# Patient Record
Sex: FEMALE
[De-identification: names, ages, dates, MRNs, and addresses within clinical notes are randomized per-mention and may not be internally consistent; named-entity substitution may affect disease eponyms.]

---

## 2018-09-23 ENCOUNTER — HOSPITAL ENCOUNTER (EMERGENCY)
Dept: HOSPITAL 97 - ER | Age: 73
LOS: 1 days | Discharge: HOME | End: 2018-09-24
Payer: MEDICARE

## 2018-09-23 DIAGNOSIS — E11.9: ICD-10-CM

## 2018-09-23 DIAGNOSIS — I10: ICD-10-CM

## 2018-09-23 DIAGNOSIS — J01.90: Primary | ICD-10-CM

## 2018-09-23 DIAGNOSIS — J45.909: ICD-10-CM

## 2018-09-23 PROCEDURE — 87081 CULTURE SCREEN ONLY: CPT

## 2018-09-23 PROCEDURE — 99284 EMERGENCY DEPT VISIT MOD MDM: CPT

## 2018-09-23 PROCEDURE — 96360 HYDRATION IV INFUSION INIT: CPT

## 2018-09-23 PROCEDURE — 71046 X-RAY EXAM CHEST 2 VIEWS: CPT

## 2018-09-23 PROCEDURE — 87070 CULTURE OTHR SPECIMN AEROBIC: CPT

## 2018-09-23 PROCEDURE — 87804 INFLUENZA ASSAY W/OPTIC: CPT

## 2018-09-24 VITALS — TEMPERATURE: 99.5 F

## 2018-09-24 VITALS — OXYGEN SATURATION: 96 % | SYSTOLIC BLOOD PRESSURE: 149 MMHG | DIASTOLIC BLOOD PRESSURE: 63 MMHG

## 2018-09-24 NOTE — EDPHYS
Physician Documentation                                                                           

 [facility]                                                                

Name: Ina Beck                                                                                

Age: 73 yrs                                                                                       

Sex: Female                                                                                       

: 1945                                                                                   

MRN: [id number]                                                                                   

Arrival Date: 2018                                                                          

Time: 22:31                                                                                       

Account#: [id number]                                                                            

Bed 19                                                                                            

Private MD:                                                                                       

ED Physician Jon Graham                                                                         

HPI:                                                                                              

                                                                                             

02:00 This 73 yrs old  Female presents to ER via Ambulatory with complaints of Cough, rn  

      Sore Throat.                                                                                

02:00 The patient or guardian reports cough. Onset: The symptoms/episode began/occurred       rn  

      yesterday. Severity of symptoms: At their worst the symptoms were mild, in the              

      emergency department the symptoms are unchanged. It is unknown whether or not the           

      patient has had similar symptoms in the past. Reports cough, sore throat, drainage,         

      feels mild sob, hx of asthma, reports throat feels swollen when lays down, ok if            

      standing or sitting, son with similar symptoms recently. .                                  

                                                                                                  

Historical:                                                                                       

- Allergies:                                                                                      

                                                                                             

22:50 No Known Allergies;                                                                     lp1 

- Home Meds:                                                                                      

22:50 Symbicort inhalation inhalation [Active]; Metoprolol Tartrate Oral [Active]; Metformin  lp1 

      Oral [Active]; Glipizide Oral [Active];                                                     

23:21 amlodipine oral [Active]; Hydralazine Oral [Active];                                    jd3 

- PMHx:                                                                                           

22:50 Hypertension; Diabetes - NIDDM; Asthma;                                                 lp1 

- PSHx:                                                                                           

22:50 Knee surgery; rotator cuff repair; ;                                           lp1 

                                                                                                  

- Immunization history:: Adult Immunizations up to date.                                          

- Social history:: Smoking status: Patient/guardian denies using tobacco.                         

- Ebola Screening: : No symptoms or risks identified at this time.                                

- Family history:: not pertinent.                                                                 

- Hospitalizations: : No recent hospitalization is reported.                                      

                                                                                                  

                                                                                                  

ROS:                                                                                              

                                                                                             

02:00 Constitutional: Negative for fever, chills, and weight loss, Eyes: Negative for injury, rn  

      pain, redness, and discharge, ENT: + sore throat and drainage Neck: Negative for            

      injury, pain, and swelling, Cardiovascular: Negative for chest pain, palpitations, and      

      edema, Respiratory: Negative for wheezing, and pleuritic chest pain, Abdomen/GI:            

      Negative for abdominal pain, nausea, vomiting, diarrhea, and constipation,                  

      MS/Extremity: Negative for injury and deformity, Skin: Negative for injury, rash, and       

      discoloration, Neuro: Negative for headache, weakness, numbness, tingling, and seizure.     

                                                                                                  

Exam:                                                                                             

02:00 Constitutional:  This is a well developed, well nourished patient who is awake, alert,  rn  

      and in no acute distress. Head/Face:  Normocephalic, atraumatic. Eyes:  Pupils equal        

      round and reactive to light, extra-ocular motions intact.  Lids and lashes normal.          

      Conjunctiva and sclera are non-icteric and not injected.  Cornea within normal limits.      

      Periorbital areas with no swelling, redness, or edema. ENT:  mild pharyngeal erythema,      

      no stridor, no exudate Neck:  Trachea midline, no thyromegaly or masses palpated, and       

      no cervical lymphadenopathy.  Supple, full range of motion without nuchal rigidity, or      

      vertebral point tenderness.  No Meningismus. Cardiovascular:  Regular rate and rhythm       

      with a normal S1 and S2.  No gallops, murmurs, or rubs.  Normal PMI, no JVD.  No pulse      

      deficits. Respiratory:  Lungs have equal breath sounds bilaterally, clear to                

      auscultation and percussion.  No rales, rhonchi or wheezes noted.  No increased work of     

      breathing, no retractions or nasal flaring. Abdomen/GI:  Soft, non-tender, with normal      

      bowel sounds.  No distension or tympany.  No guarding or rebound.  No evidence of           

      tenderness throughout. MS/ Extremity:  Pulses equal, no cyanosis.  Neurovascular            

      intact.  Full, normal range of motion.  Equal circumference. Neuro:  Awake and alert,       

      GCS 15, oriented to person, place, time, and situation.  Cranial nerves II-XII grossly      

      intact.  Motor strength 5/5 in all extremities.  Sensory grossly intact.  Cerebellar        

      exam normal.  Normal gait.                                                                  

                                                                                                  

Vital Signs:                                                                                      

                                                                                             

22:48  / 82; Pulse 112; Resp 20; Temp 99.5(O); Pulse Ox 97% on R/A; Weight 92.53 kg;    lp1 

      Height 4 ft. 11 in. (149.86 cm);                                                            

23:34  / 68; Pulse 99; Resp 18 S; Pulse Ox 96% on R/A;                                  jd3 

                                                                                             

00:18  / 58; Pulse 94; Resp 18 S; Pulse Ox 95% on R/A;                                  jd3 

01:03  / 59; Pulse 94; Resp 18 S; Pulse Ox 100% on R/A;                                 j 

02:  / 63; Pulse 92; Resp 18 S; Pulse Ox 96% on R/A;                                  d3 

                                                                                             

22:48 Body Mass Index 41.20 (92.53 kg, 149.86 cm)                                             lp1 

                                                                                                  

MDM:                                                                                              

                                                                                             

22:41 Patient medically screened.                                                             rn  

                                                                                             

02:00 Differential Diagnosis: Bronchitis Influenza Upper Respiratory Infection Pharyngitis    rn  

      Viral Syndrome Pneumonia. Data reviewed: vital signs, nurses notes, lab test result(s),     

      radiologic studies, plain films, and as a result, I will discharge patient. Counseling:     

      I had a detailed discussion with the patient and/or guardian regarding: the historical      

      points, exam findings, and any diagnostic results supporting the discharge/admit            

      diagnosis, lab results, radiology results, the need for outpatient follow up, to return     

      to the emergency department if symptoms worsen or persist or if there are any questions     

      or concerns that arise at home. Response to treatment: the patient's symptoms have          

      mildly improved after treatment, and as a result, I will discharge patient. Special         

      discussion: I discussed with the patient/guardian in detail that at this point there is     

      no indication for admission to the hospital. It is understood, however, that if the         

      symptoms persist or worsen the patient needs to return immediately for re-evaluation.       

      ED course: Pt improved, strep/flu neg, will dc home with z pack and pcp f/u. .              

                                                                                                  

                                                                                             

22:55 Order name: Flu; Complete Time: 01:28                                                   rn  

                                                                                             

22:55 Order name: Strep; Complete Time: 01:                                                 rn  

                                                                                             

22:55 Order name: XRAY Chest Pa And Lat (2 Views)                                             rn  

                                                                                             

23:38 Order name: Throat Culture                                                              [facility]

                                                                                             

22:55 Order name: IV Start; Complete Time: 23:13                                              rn  

                                                                                                  

Administered Medications:                                                                         

                                                                                             

23:12 Drug: Xopenex 1.25 mg Route: Inhalation;                                                [facility] 

                                                                                             

00:29 Follow up: Response: No adverse reaction                                                [facility] 

                                                                                             

23:13 Drug: NS 0.9% 500 ml Route: IV; Rate: bolus; Site: right antecubital;                   j 

                                                                                             

00:29 Follow up: Response: No adverse reaction; IV Status: Completed infusion; IV Intake:     jd3 

      500ml                                                                                       

01:09 Follow up: Response: No adverse reaction; IV Status: Completed infusion; IV Intake:     jd3 

      500ml                                                                                       

02:00 Drug: Zithromax 500 mg Route: PO;                                                       jd3 

02:15 Follow up: Response: No adverse reaction                                                jd3 

02:22 Drug: Tussionex Pennkinetic ER 5 ml Route: PO;                                          jd3 

02:22 Follow up: Response: Medication administered at discharge.                              jd3 

                                                                                                  

                                                                                                  

Disposition:                                                                                      

18 02:04 Discharged to Home. Impression: Cough, Acute sinusitis.                            

- Condition is Stable.                                                                            

- Discharge Instructions: Cough, Adult.                                                           

- Prescriptions for Zithromax Z- Demetrius 250 mg Oral Tablet - take 1 tablet by ORAL route             

  as directed for 5 days Day 1 - take two (2) tablets one time. Day 2, 3, 4 , 5 take              

  one (1) tablet once daily.; 6 tablet.                                                           

- Medication Reconciliation Form, Thank You Letter, Antibiotic Education, Prescription            

  Opioid Use form.                                                                                

- Follow up: Private Physician; When: As needed; Reason: Recheck today's complaints,              

  Re-evaluation by your physician.                                                                

- Problem is new.                                                                                 

- Symptoms have improved.                                                                         

                                                                                                  

                                                                                                  

                                                                                                  

Signatures:                                                                                       

Dispatcher [employer]                           EDMS                                                 

Jon Graham MD MD rn Pena, Laura, RN                         RN   lp1                                                  

Praveen Dickerson RN                    RN   jd3                                                  

                                                                                                  

Corrections: (The following items were deleted from the chart)                                    

02:24 02:04 2018 02:04 Discharged to Home. Impression: Cough; Acute sinusitis.          jd3 

      Condition is Stable. Forms are Medication Reconciliation Form, Thank You Letter,            

      Antibiotic Education, Prescription Opioid Use. Follow up: Private Physician; When: As       

      needed; Reason: Recheck today's complaints, Re-evaluation by your physician. Problem is     

      new. Symptoms have improved. rn                                                             

                                                                                                  

**************************************************************************************************

## 2018-09-24 NOTE — ER
Nurse's Notes                                                                                     

 [facility]                                                                

Name: Ina Beck                                                                                

Age: 73 yrs                                                                                       

Sex: Female                                                                                       

: 1945                                                                                   

MRN: [id number]                                                                                   

Arrival Date: 2018                                                                          

Time: 22:31                                                                                       

Account#: [id number]                                                                            

Bed 19                                                                                            

Private MD:                                                                                       

Diagnosis: Cough;Acute sinusitis                                                                  

                                                                                                  

Presentation:                                                                                     

                                                                                             

22:47 Presenting complaint: Patient states: Cough, congestion, sore throat that began this    lp1 

      morning, feeling worse now; Denies fever but felt chills today; states "when I lay          

      down, I feel like I can't catch my breath. Transition of care: patient was not received     

      from another setting of care. Onset of symptoms was 2018. Risk                

      Assessment: Do you want to hurt yourself or someone else? Patient reports no desire to      

      harm self or others. Initial Sepsis Screen: Does the patient meet any 2 criteria? No.       

      Patient's initial sepsis screen is negative. Does the patient have a suspected source       

      of infection? No. Patient's initial sepsis screen is negative. Care prior to arrival:       

      None.                                                                                       

22:47 Method Of Arrival: Ambulatory                                                           lp1 

22:47 Acuity: PHIL 3                                                                           lp1 

                                                                                                  

Historical:                                                                                       

- Allergies:                                                                                      

22:50 No Known Allergies;                                                                     lp1 

- Home Meds:                                                                                      

22:50 Symbicort inhalation inhalation [Active]; Metoprolol Tartrate Oral [Active]; Metformin  lp1 

      Oral [Active]; Glipizide Oral [Active];                                                     

23:21 amlodipine oral [Active]; Hydralazine Oral [Active];                                    jd3 

- PMHx:                                                                                           

22:50 Hypertension; Diabetes - NIDDM; Asthma;                                                 lp1 

- PSHx:                                                                                           

22:50 Knee surgery; rotator cuff repair; ;                                           lp1 

                                                                                                  

- Immunization history:: Adult Immunizations up to date.                                          

- Social history:: Smoking status: Patient/guardian denies using tobacco.                         

- Ebola Screening: : No symptoms or risks identified at this time.                                

- Family history:: not pertinent.                                                                 

- Hospitalizations: : No recent hospitalization is reported.                                      

                                                                                                  

                                                                                                  

Screenin:49 Abuse screen: Denies threats or abuse. Nutritional screening: No deficits noted.        jd3 

      Tuberculosis screening: No symptoms or risk factors identified. Fall Risk Ambulatory        

      Aid- None/Bed Rest/Nurse Assist (0 pts). Gait- Normal/Bed Rest/Wheelchair (0 pts)           

      Mental Status- Oriented to own ability (0 pts). Total Esposito Fall Scale indicates No         

      Risk (0-24 pts).                                                                            

                                                                                                  

Assessment:                                                                                       

22:47 General: Appears uncomfortable, Behavior is calm, cooperative, appropriate for age.     jd3 

      Pain: Complains of pain in head Quality of pain is described as aching, dull. Neuro:        

      Level of Consciousness is awake, alert, obeys commands, Oriented to person, place,          

      time, situation, Appropriate for age. Cardiovascular: Capillary refill < 3 seconds          

      Patient's skin is warm and dry. Respiratory: Reports cough that is non-productive,          

      persistent Airway is patent Respiratory effort is even, unlabored, Respiratory pattern      

      is regular, symmetrical, Breath sounds are clear bilaterally. GI: No signs and/or           

      symptoms were reported involving the gastrointestinal system. : No signs and/or           

      symptoms were reported regarding the genitourinary system. EENT: Throat is reddened.        

      Derm: Skin is intact, Skin is dry, Skin is normal, Skin temperature is warm.                

      Musculoskeletal: Circulation, motion, and sensation intact. Range of motion: intact in      

      all extremities.                                                                            

23:34 Reassessment: Patient appears in no apparent distress at this time. Patient and/or      jd3 

      family updated on plan of care and expected duration. Pain level reassessed. Patient is     

      alert, oriented x 3, equal unlabored respirations, skin warm/dry/pink.                      

                                                                                             

00:17 Reassessment: Patient appears in no apparent distress at this time. No changes from     jd3 

      previously documented assessment. Patient and/or family updated on plan of care and         

      expected duration. Pain level reassessed. Patient is alert, oriented x 3, equal             

      unlabored respirations, skin warm/dry/pink.                                                 

01:03 Reassessment: Patient appears in no apparent distress at this time. Patient and/or      jd3 

      family updated on plan of care and expected duration. Pain level reassessed. Patient is     

      alert, oriented x 3, equal unlabored respirations, skin warm/dry/pink.                      

02:23 Reassessment: Patient appears in no apparent distress at this time. Patient and/or      jd3 

      family updated on plan of care and expected duration. Pain level reassessed. Patient is     

      alert, oriented x 3, equal unlabored respirations, skin warm/dry/pink.                      

                                                                                                  

Vital Signs:                                                                                      

                                                                                             

22:48  / 82; Pulse 112; Resp 20; Temp 99.5(O); Pulse Ox 97% on R/A; Weight 92.53 kg;    lp1 

      Height 4 ft. 11 in. (149.86 cm);                                                            

23:34  / 68; Pulse 99; Resp 18 S; Pulse Ox 96% on R/A;                                  jd3 

                                                                                             

00:18  / 58; Pulse 94; Resp 18 S; Pulse Ox 95% on R/A;                                  jd3 

01:03  / 59; Pulse 94; Resp 18 S; Pulse Ox 100% on R/A;                                 jd3 

02:23  / 63; Pulse 92; Resp 18 S; Pulse Ox 96% on R/A;                                  jd3 

                                                                                             

22:48 Body Mass Index 41.20 (92.53 kg, 149.86 cm)                                             lp1 

                                                                                                  

ED Course:                                                                                        

                                                                                             

22:31 Patient arrived in ED.                                                                  es  

22:36 Praveen Dickerson, RN is Primary Nurse.                                                  jd3 

22:41 Jon Graham MD is Attending Physician.                                                rn  

22:48 Triage completed.                                                                       lp1 

22:48 Arm band placed on left wrist.                                                          lp1 

22:49 Patient has correct armband on for positive identification. Bed in low position. Call   jd3 

      light in reach. Side rails up X 1. Adult w/ patient.                                        

23:13 Inserted saline lock: 20 gauge in right antecubital area, using aseptic technique.      jd3 

23:25 Patient moved to radiology via wheelchair.                                              tm4 

23:25 X-ray completed. Patient tolerated procedure well.                                      tm4 

23:25 Patient moved back from radiology.                                                      tm4 

23:38 XRAY Chest Pa And Lat (2 Views) In Process Unspecified.                                 EDMS

09                                                                                             

02:22 No provider procedures requiring assistance completed. IV discontinued, intact,         jd3 

      bleeding controlled, No redness/swelling at site. Pressure dressing applied.                

                                                                                                  

Administered Medications:                                                                         

                                                                                             

23:12 Drug: Xopenex 1.25 mg Route: Inhalation;                                                jd3 

                                                                                             

00:29 Follow up: Response: No adverse reaction                                                jd3 

                                                                                             

23:13 Drug: NS 0.9% 500 ml Route: IV; Rate: bolus; Site: right antecubital;                   jd3 

                                                                                             

00:29 Follow up: Response: No adverse reaction; IV Status: Completed infusion; IV Intake:     jd3 

      500ml                                                                                       

01:09 Follow up: Response: No adverse reaction; IV Status: Completed infusion; IV Intake:     jd3 

      500ml                                                                                       

02:00 Drug: Zithromax 500 mg Route: PO;                                                       jd3 

02:15 Follow up: Response: No adverse reaction                                                jd3 

02:22 Drug: Tussionex Pennkinetic ER 5 ml Route: PO;                                          jd3 

02:22 Follow up: Response: Medication administered at discharge.                              jd3 

                                                                                                  

                                                                                                  

Intake:                                                                                           

00:29 IV: 500ml; Total: 500ml.                                                                jd3 

01:09 IV: 500ml; Total: 1000ml.                                                               jd3 

                                                                                                  

Outcome:                                                                                          

02:04 Discharge ordered by MD.                                                                rn  

02:24 Discharged to home ambulatory, with family.                                             jd3 

02:24 Condition: stable                                                                           

02:24 Discharge instructions given to patient, family, Instructed on discharge instructions,      

      follow up and referral plans. medication usage, Demonstrated understanding of               

      instructions, follow-up care, medications, Prescriptions given X 1.                         

02:24 Patient left the ED.                                                                    jd3 

                                                                                                  

Signatures:                                                                                       

Dispatcher [employer]                           Glory Cruz Tracy                             tm4                                                  

Jon Graham MD MD rn Pena, Laura, RN                         RN   lp1                                                  

Praveen Dickerson RN                    RN   jd3                                                  

                                                                                                  

**************************************************************************************************

## 2018-09-24 NOTE — RAD REPORT
EXAM DESCRIPTION:  RAD - Chest Pa And Lat (2 Views) - 9/23/2018 11:38 pm

 

CLINICAL HISTORY:  Cough and congestion

 

COMPARISON:  June 2015

 

TECHNIQUE:  PA and lateral views of the chest were obtained.

 

FINDINGS:  The lungs are clear.  Lung markings are similar to comparison. Heart size is normal and ce
ntral vasculature is within normal limits.  No pleural effusion or pneumothorax seen.  No acute bony 
finding noted.  No aortic abnormality.  No significant interval change.

 

IMPRESSION:  No acute cardiopulmonary process.

## 2021-01-11 NOTE — RAD REPORT
EXAM DESCRIPTION:  RAD - Chest Pa And Lat (2 Views) - 1/11/2021 4:28 pm

 

CLINICAL HISTORY:  preop

Chest pain.

 

COMPARISON:  Chest Pa And Lat (2 Views) dated 9/23/2018; CHEST PA AND LAT 2 VIEW dated 6/12/2015; LIAT
ST PA AND LAT 2 VIEW dated 2/1/2015; CHEST PA AND LAT 2 VIEW dated 5/21/2014

 

FINDINGS:  The lungs are clear. The heart is upper limit normal in size. No displaced fractures.

 

IMPRESSION:  No acute or concerning finding suspected.

## 2021-01-13 NOTE — P.BOP
Preoperative diagnosis: biliary dyskinesia, RUQ abd pain


Postoperative diagnosis: same, cholecystitis


Primary procedure: Laparoscopic cholecystectomy


Estimated blood loss: <10cc


Specimen: gb


Findings: see dictation


Anesthesia: General


Complications: None


Transferred to: Recovery Room


Condition: Good

## 2021-01-13 NOTE — XMS REPORT
Continuity of Care Document

                           Created on:2021



Patient:SAMUEL TELLO

Sex:Female

:1945

External Reference #:[id number]





Demographics







                          Address                   [address] [place] [facility]



                                                    [place] 



                                                    [place] [address]

 

                          Home Phone                [phone]

 

                          Email Address             [email or web address]

 

                          Preferred Language        English

 

                          Marital Status            Unknown

 

                          Mor[employer] Affiliation     Unknown

 

                          Race                      Unknown

 

                          Additional Race(s)        Unavailable



                                                    White

 

                          Ethnic Group              Unknown









Author







                          Organization              [facility]

t

 

                          Address                   [address]



                                                    [place] [address]

 

                          Phone                     [phone]









Support







                Name            Relationship    Address         Phone

 

                ELISHA          Unavailable     [address]       [phone]



                                                [place] [address] 

 

                ELISHA          Unavailable     Unavailable     [phone]









Care Team Providers







                    Name                Role                Phone

 

                    Unavailable         Unavailable         Unavailable









Problems

This patient has no known problems.



Allergies, Adverse Reactions, Alerts

This patient has no known allergies or adverse reactions.



Medications







       Ordered Filled Start  Stop   Current Ordering Indication Dosage Frequency

 Signature

                    Comments            Components          Source



     Medication Medication Date Date Medication? Clinician                (SIG) 

          



     Name Name                                                   

 

     Alendronate Alendronate           Yes  Seferino                not           

 [facility]



     Sodium Sodium                Osorio                defined           [facility]



                                                                 [facility]



                                                                 [facility]



                                                                 [facility]

 

     Losartan Losartan           Yes  [facility]eferino                not            [facility]

t



     Potassium Potassium                Osorio                defined           

[facility]



                                                                 [facility]



                                                                 [facility]



                                                                 [facility]



                                                                 Clinics

 

     GlipiZIDE GlipiZIDE           Yes  Seferino                not            [facility]



                              Osorio                defined           [facility]



                                                                 [facility]



                                                                 [facility]



                                                                 [facility]in[facility]

 

     Atorvastati Atorvastati           Yes  Seferino                not           

 [facility]



     n Calcium n Calcium                Osorio                defined           

[facility]



                                                                 [facility]



                                                                 [facility]



                                                                 [facility]



                                                                 Clinics

 

     Metformin Metformin           Yes  [facility]eferino                not            [facility]



     HCl  HCl                 Osorio                defined           [facility]



                                                                 [facility]



                                                                 [facility]



                                                                 ent



                                                                 Clinics

 

     Metoprolol Metoprolol           Yes  Seferino                not            [facility]

[facility]



     Tartrate Tartrate                Osorio                defined           [facility]

[facility]



                                                                 [facility]



                                                                 [facility]



                                                                 [facility]



                                                                 ent



                                                                 Clinics







Procedures

This patient has no known procedures.



Encounters







        Start   End     Encounter Admission Attending Care    Care    Encounter 

Source



        Date/Time Date/Time Type    Type    Clinicians Facility Department ID   

   

 

        2020-10-06 2020-10-06 Outpatient                 [facility]  [facility]  [id number]

 [facility]



        00:00[facility]00 [facility]00                                                 [facility]



                                                                        [facility]



                                                                        [facility]



                                                                        [facility]



                                                                        Clinics

 

        2019 Outpatient                 [facility] [address]

[id number] [facility]



        08:00:00 08:00:00                         [facility]



                                                [facility]

a



                                                [facility]         [facility]         ent



                                                                        Clinics







Results

This patient has no known results.

## 2021-01-13 NOTE — P.CNS
Date of Consult: 21


Reason for Consult: Medical management


Requesting Physician: Eddie Briscoe


Chief Complaint: Bradycardia


History of Present Illness: 


75-year-old woman with a history of hypertension and diabetes had and elective 

laparoscopic cholecystectomy for biliary dyskinesia.  Patient is reported to 

have developed bradycardia in the immediate postop period.  She is therefore 

being observed overnight with cardiac monitoring.  Hospitalist service is 

consulted to assist with management of medical problems which include 

hypertension and diabetes.  Patient at the moment denies any complain.


Her heart rate has been stable since transfer from the recovery room.





Allergies





No Known Drug Allergies Allergy (Verified 21 14:52)


   Unknown





Home Medications: 








Aspirin [Aspirin EC] 81 mg PO DAILY 08/04/15 


Atorvastatin Calcium 40 mg PO BEDTIME 21 


Carvedilol [Coreg] 0.5 tab PO BID 21 


Glipizide [Glipizide ER] 5 mg PO BID 21 


Losartan Potassium 100 mg PO DAILY 21 


Metformin ER [Glucophage ER] 500 mg PO BID 21 


Spironolactone 25 mg PO DAILY 21 








- Past Medical/Surgical History


Diabetic: Yes


-: Right Cataract


-: Hypertension


-: DM


-: Bilateral knee replacement


-: rt rotator cuff repair  


-: carpal tunnel repair both wrists  


-: c section  


-: heel spurs 20 yrs ago





- Family History


  ** Brother


Medical History: 


Notes: mother was very healthy,  did not know fathers health status-left when 

she was 3 yrs.  brother  at old age over 70-was very healthy





- Social History


Smoking Status: Never smoker


Alcohol use: No


CD- Drugs: No


Caffeine use: Yes


Place of Residence: Home





Review of Systems


10-point ROS is otherwise unremarkable





Physical Examination














Temp Pulse Resp BP Pulse Ox


 


 96.3 F L  85   16   135/58 L   


 


 21 12:10  21 12:10  21 12:10  21 12:30   








General: Alert, In no apparent distress, Oriented x3


HEENT: Atraumatic, Normocephalic, PERRLA, Mucous membr. moist/pink, EOMI, 

Sclerae nonicteric


Neck: Supple, JVD not distended


Respiratory: Clear to auscultation bilaterally, Normal air movement


Cardiovascular: No edema, Regular rate/rhythm, Normal S1 S2, No murmurs


Capillary refill: <2 Seconds


Gastrointestinal: Normal bowel sounds, Soft and benign, Non-distended, No 

tenderness


Musculoskeletal: No swelling, No tenderness


Integumentary: No rashes, No erythema


Neurological: Normal speech, Normal strength at 5/5 x4 extr, Cranial nerves 3-12

intact





- Problems


(1) Bradycardia


Current Visit: Yes   Status: Acute   





(2) Diabetes mellitus


Onset Date: 08/05/15   Current Visit: No   Status: Acute   





(3) High blood pressure


Onset Date: 08/05/15   Current Visit: No   Status: Acute   


Conclusions/Impression: 


Resume losartan.


Hold Coreg due to bradycardia.


Monitor blood pressure.


Mild insulin sliding scale.


Hold metformin and glipizide.


Patient started on clear liquid diet.

## 2021-01-13 NOTE — EKG
Test Date:    2021-01-11               Test Time:    15:23:56

Technician:   LILIANA                                   

                                                     

MEASUREMENT RESULTS:                                       

Intervals:                                           

Rate:         69                                     

PA:           148                                    

QRSD:         90                                     

QT:           390                                    

QTc:          417                                    

Axis:                                                

P:            55                                     

PA:           148                                    

QRS:          74                                     

T:            78                                     

                                                     

INTERPRETIVE STATEMENTS:                                       

                                                     

Sinus rhythm with premature atrial complexes

Otherwise normal ECG

Compared to ECG 06/12/2015 15:26:18

Atrial premature complex(es) now present



Electronically Signed On 01-13-21 06:09:18 CST by Jorge Mcginnis

## 2021-01-13 NOTE — OP
Date of Procedure:  01/13/2021



Surgeon:  Eddie Briscoe MD



Postoperative Diagnoses:  Biliary dyskinesia, right upper quadrant abdominal pain.



Postoperative Diagnoses:  Biliary dyskinesia, right upper quadrant abdominal pain, acute cholecystiti
s.



Procedure:  Laparoscopic cholecystectomy.



Specimen:  Gallbladder.



Anesthesia:  General plus local.



Findings:  Inflammation of the gallbladder plus above.  Also during the procedure as per Anesthesia, 
they believe the patient has a vasovagal with a short episode of hypotension.



Anesthesia:  General plus local.



Indications:  This is a case of a 75-year-old patient, who comes to us with above diagnosis.  Patient
 wants surgery to be done.  She understands the benefits, alternatives, and risks of laparoscopic, po
ssible open cholecystectomy, which include, but not limited to infection, bleeding, damage to adjacen
t structures, anesthesia complication, recurrence, MI, and death.  She also understands this may not 
relieve the symptoms.  She might need more than 1 surgical intervention.  She understands the situati
on of the country with COVID cases, but also she feels that she has been waiting a long enough and sh
e want to improve and get better, continue with her life, and go back to normal duties and have citlaly
r quality of life because this pain holds her back.  __________ surgery.  She has been trying to even
 control her diet, but has not improved yet.



Description Of Procedure:  Patient was brought to the operating room, placed in supine position.  Ane
sthesia was done without complication.  Abdominal area was prepped and draped in a sterile fashion.  
Marcaine 0.5% was injected for local anesthetic followed by sharp incision of the skin in the infraum
bilical region.  Incision was carried down to fascia, which was opened under direct vision.  Peritone
um was encountered, opened under direct vision.  Vicryl #1 placed inside the fascia.  Colby trocar w
as carefully introduced.  No bleeding was obtained.  I placed 3 more trocars 5 mm each one of them in
 the epigastric right upper quadrant area under direct visualization.  When we insufflated the area, 
the Anesthesia felt that the heart rate dropped and they asked me to relive the abdomen and it improv
ed immediately.  They believed vasovagal, everything else looked intact.  The gave me the green light
 to continue and the procedure was done.  I placed a grasper in the fundus of the gallbladder and ano
ther grasper in the infundibulum retracting the gallbladder in the inferolateral fashion exposing the
 triangle of Calot, obtaining critical view of safety.  Cystic duct and cystic artery were clearly is
olated freed circumferentially and a connection between those and the gallbladder was clearly identif
ied.  I proceeded to ligate those by using at least 3 clips proximal, 1 clip distal, and ligation in 
the middle.  Same was done with the cystic artery.  A small branch of the cystic artery was also liga
paulie using the same technique.  Hepatic arteries and common bile duct were protected at all times.  Th
e gallbladder was removed from liver using Bovie cauterizer and removed from abdominal cavity using E
ndoCatch through the umbilical incision.  Heart rate continued steady and normal.  At that moment, th
en I proceeded to check the area once again after irrigation and suction.  There were intact staples 
and no bleeding.  No bile leak.  No bleeding.  At that moment, I proceeded to remove the trocars unde
r direct vision.  Deflated the pneumoperitoneum.  Closed the fascia with #1 Vicryl.  Irrigated the carvajal
bcutaneous tissue, closed that with 3-0 chromic and the skin with 3-0 chromic in a subcuticular fashi
on.  Sponge count and instrument counts correct.  Vital signs are stable.  The patient was sent to re
covery in stable condition.  Due to that small episode of bradycardia, I believe it is safer to keep 
the patient overnight on telemetry.  Anesthesia will do the workup for the bradycardia, although they
 believe it is just a decrease in heart rate.





SEEMA/MAYA

DD:  01/13/2021 08:45:56Voice ID:  [id number]

DT:  01/13/2021 10:01:15Report ID:  [id number]

## 2021-01-14 NOTE — P.PN
Subjective


Date of Service: 01/14/21


Chief Complaint: Bradycardia


Patient has no complain today.


Noted to have hyperkalemia.


She has been on spironolactone and losartan.


Indication for the spironolactone is not clear.








Physical Examination





- Vital Signs


Temperature: 97 F


Blood Pressure: 153/66


Pulse: 64


Respirations: 20


Pulse Ox (%): 97





- Physical Exam


General: Alert, In no apparent distress, Oriented x3


Neck: Supple


Respiratory: Clear to auscultation bilaterally, Normal air movement


Cardiovascular: No edema, Regular rate/rhythm, Normal S1 S2


Gastrointestinal: Normal bowel sounds, Soft and benign, Non-distended


Musculoskeletal: No swelling, No tenderness


Integumentary: No rashes


Neurological: Normal speech, Normal strength at 5/5 x4 extr





- Studies


Laboratory Data (last 24 hrs)





01/14/21 09:51: Potassium 5.4 H


01/14/21 03:58: Sodium 139, Potassium 5.8 H*, BUN 24 H, Creatinine 1.24, Glucose

167 H


01/14/21 03:58: WBC 13.8 H D, Hgb 11.0 L, Hct 33.8 L, Plt Count 282








Assessment And Plan





- Current Problems (Diagnosis)


(1) Bradycardia


Current Visit: Yes   Status: Acute   





(2) Diabetes mellitus


Onset Date: 08/05/15   Current Visit: No   Status: Acute   





(3) High blood pressure


Onset Date: 08/05/15   Current Visit: No   Status: Acute   





(4) Hyperkalemia


Current Visit: Yes   Status: Acute   





- Plan


Hyperkalemia likely related to ARB and Aldactone.


Will discontinue Aldactone.


Patient given Kayexalate to treat the hyperkalemia.


Patient also hydrated with IV fluid.


She has no complain.


Repeat potassium level 4 hours from Kayexalate administration.


Clinically stable.

## 2021-01-14 NOTE — DS
Diagnoses:  Acute cholecystitis, right upper quadrant abdominal pain, cardiac arrhythmias, diabetes, 
biliary dyskinesia.



Procedure:  Laparoscopic cholecystectomy.



Disposition:  Home.



Activity:  As tolerated.  No heavy lifting.



Plan:  Follow up in my office in 1 week.  Call for appointment at [phone].



Physical Examination:

General:  The patient is awake, alert, no distress. 

HEENT:  Pupils equal, reactive.  Anicteric. 

Neck:  Supple. 

Chest:  Clear. 

Abdomen:  Soft and depressible.  Intact surgical site. 

Extremities:  Good capillary refill.



Hospital Course:  The patient was kept overnight for pain control also.  During the surgery, she has 
some cardiac arrhythmias.  Admitted to the hospital, consulted hospitalist.  They are controlling her
 glucose and heart has not been an issue, so we have a clearance from Internal Medicine.  We are briana
g to send the patient home and then follow up in my office in a week and advised her to followup with
 her cardiologist and primary doctor.





SEEMA/MAYA

DD:  01/14/2021 15:10:57Voice ID:  [id number]

DT:  01/14/2021 18:18:02Report ID:  [id number]

## 2022-04-23 NOTE — EDPHYS
Physician Documentation                                                                           

 [facility]                                                                 

Name: Ina Beck                                                                                

Age: 76 yrs                                                                                       

Sex: Female                                                                                       

: 1945                                                                                   

MRN: [id number]                                                                                   

Arrival Date: 2022                                                                          

Time: 15:03                                                                                       

Account#: [id number]                                                                            

Bed 15                                                                                            

Private MD:                                                                                       

ED Physician Reba Oakley                                                                    

HPI:                                                                                              

                                                                                             

16:04 This 76 yrs old  Female presents to ER via Ambulatory with complaints of Asthma jmm 

      Exacerbation.                                                                               

16:04 Onset: The symptoms/episode began/occurred gradually, 2 day(s) ago. Modifying factors:  jmm 

      The symptoms are alleviated by nothing, the symptoms are aggravated by nothing.             

      Associated signs and symptoms: Pertinent negatives: chest pain, fever, vomiting. The        

      patient has experienced similar episodes in the past.                                       

                                                                                                  

Historical:                                                                                       

- Allergies:                                                                                      

15:19 No Known Allergies;                                                                     vg1 

- Home Meds:                                                                                      

15:19 Glipizide Oral [Active]; Hydralazine Oral [Active]; Metformin Oral [Active]; Symbicort  vg1 

      inhalation [Active];                                                                        

- PMHx:                                                                                           

15:19 Asthma; Diabetes - NIDDM; Hypertension;                                                 vg1 

                                                                                                  

- Immunization history:: Client reports having NOT received the Covid vaccine.                    

- Social history:: Smoking status: Patient denies any tobacco usage or history of.                

                                                                                                  

                                                                                                  

ROS:                                                                                              

16:04 Constitutional: Negative for fever, chills, and weight loss, Cardiovascular: Negative   jmm 

      for chest pain, palpitations, and edema.                                                    

16:04 Respiratory: Positive for cough, wheezing.                                                  

16:04 All other systems are negative.                                                             

                                                                                                  

Exam:                                                                                             

16:04 Constitutional:  This is a well developed, well nourished patient who is awake, alert,  jmm 

      and in no acute distress. Head/Face:  atraumatic. Eyes:  EOMI, no conjunctival erythema     

      appreciated ENT:  Moist Mucus Membranes Neck:  Trachea midline, Supple Chest/axilla:        

      Normal chest wall appearance and motion.   Cardiovascular:  Regular rate and rhythm.        

      No edema appreciated                                                                        

16:04 Back:  Normal ROM Skin:  General appearance color normal MS/ Extremity:  Moves all          

      extremities, no obvious deformities appreciated, no edema noted to the lower                

      extremities  Neuro:  Awake and alert Psych:  Behavior is normal, Mood is normal,            

      Patient is cooperative and pleasant                                                         

16:04 Respiratory: mild respiratory distress is noted,  Respirations: normal, Breath sounds:      

      wheezing: that is moderate, is heard diffusely.                                             

                                                                                                  

Vital Signs:                                                                                      

15:14  / 73; Pulse 69; Resp 18; Temp 99.0(TE); Pulse Ox 97% on R/A; Weight 88.45 kg;    vg1 

      Height 4 ft. 11 in. (149.86 cm); Pain 0/10;                                                 

16:45  / 78; Pulse 62; Resp 18; Pulse Ox 98% on Nebulizer Mask;                         ph  

18:00  / 72; Pulse 64; Resp 18; Temp 98.2; Pulse Ox 97% on R/A;                         ph  

15:14 Body Mass Index 39.38 (88.45 kg, 149.86 cm)                                             vg1 

                                                                                                  

MDM:                                                                                              

16:04 Patient medically screened.                                                             [facility] 

18:49 Data reviewed: vital signs, nurses notes.                                               [facility] 

18:49 Counseling: I had a detailed discussion with the patient and/or guardian regarding: the [facility] 

      historical points, exam findings, and any diagnostic results supporting the                 

      discharge/admit diagnosis, the need for outpatient follow up, to return to the              

      emergency department if symptoms worsen or persist or if there are any questions or         

      concerns that arise at home. ED course: Increased bs, and decreased wheezing on             

      reevaluation. Patient is alert and non toxic in appearance in the ED. No resp distress      

      on discharge. .                                                                             

                                                                                                  

Administered Medications:                                                                         

16:44 Drug: predniSONE 60 mg Route: PO;                                                       ph  

17:00 Follow up: Response: No adverse reaction                                                ph  

16:44 Drug: Xopenex (levalbuterol) (3) 1.25 mg Route: Inhalation;                             ph  

17:00 Follow up: Response: No adverse reaction                                                ph  

                                                                                                  

                                                                                                  

Disposition Summary:                                                                              

22 18:50                                                                                    

Discharge Ordered                                                                                 

      Location: Home                                                                          [facility] 

      Condition: Stable                                                                       [facility] 

      Diagnosis                                                                                   

        - Unspecified asthma with (acute) exacerbation                                        [facility] 

      Followup:                                                                               [facility] 

        - With: Private Physician                                                                  

        - When: 2 - 3 days                                                                         

        - Reason: Recheck today's complaints, Continuance of care, Re-evaluation by your           

      physician                                                                                   

      Discharge Instructions:                                                                     

        - Discharge Summary Sheet                                                             [facility] 

        - Asthma, Adult                                                                       [facility] 

      Forms:                                                                                      

        - Medication Reconciliation Form                                                      [facility] 

        - Thank You Letter                                                                    [facility] 

        - Antibiotic Education                                                                [facility] 

        - Prescription Opioid Use                                                             [facility] 

      Prescriptions:                                                                              

        - Prednisone 20 mg Oral Tablet                                                             

            - take 3 tablets by ORAL route once daily for 5 days; 15 tablet; Refills: 0,      [facility] 

      Product Selection Permitted                                                                 

Signatures:                                                                                       

Panfilo Hannon PA PA jmm Hall, Patricia, RN                      RN   ph                                                   

Michell Beck, RN                    RN   vg1                                                  

                                                                                                  

************************************************************************************************** English

## 2022-04-23 NOTE — XMS REPORT
Continuity of Care Document

                            Created on:2022



Patient:SAMUEL TELLO

Sex:Female

:1945

External Reference #:[id number]





Demographics







                          Address                   [address]



                                                    [place] 



                                                    [place] [address]

 

                          Home Phone                [phone]

 

                          Email Address             [email or web address]

 

                          Preferred Language        English

 

                          Marital Status            Unknown

 

                          Jainism Affiliation     Unknown

 

                          Race                      Unknown

 

                          Additional Race(s)        Unavailable



                                                    White

 

                          Ethnic Group              Unknown









Author







                          Organization              [facility]

t

 

                          Address                   [address]



                                                    [place] [address]

 

                          Phone                     [phone]









Support







                Name            Relationship    Address         Phone

 

                ELISHA          Unavailable     [address]       [phone]



                                                [place] [address] 

 

                ELISHA          Unavailable     Unavailable     [phone]

 

                DENT          Unavailable     Unavailable     [phone]









Care Team Providers







                    Name                Role                Phone

 

                    FLOYD Nash           Attending Clinician Unavailable

 

                    Ige-Odunuga_J_AH    Attending Clinician Unavailable

 

                    Ige-Odunuga_J_AH    Admitting Clinician Unavailable









Payers







           Payer Name Policy Type Policy Number Effective Date Expiration Date [facility]            [id number]     [id number]            



           (MEDICARE                        00:00:00              



           REPLACEMENT HMO)                                             

 

           [employer] [place] -            [id number]                         



           [employer] (MEDICARE                                             



           REPLACEMENT/[employer]                                             



           [employer] - HMO)                                              







Problems

This patient has no known problems.



Allergies, Adverse Reactions, Alerts

This patient has no known allergies or adverse reactions.



Medications







       Ordered Filled Start  Stop   Current Ordering Indication Dosage Frequency

 Signature

                    Comments            Components          Source



     Medication Medication Date Date Medication? Clinician                (SIG) 

          



     Name Name                                                   

 

     Alendronate Alendronate           Yes  Seferino                not           

 [facility]



     Sodium Sodium                Osorio                defined           [facility]



                                                                 [facility]



                                                                 [facility]



                                                                 [facility]



                                                                 Clinics

 

     Losartan Losartan           Yes  [facility]ino                not            [facility]

t



     Potassium Potassium                Osorio                defined           

[facility]



                                                                 [facility]



                                                                 [facility]



                                                                 ent



                                                                 Clinics

 

     GlipiZIDE GlipiZIDE           Yes  [facility]ino                not            [facility]



                              Osorio                defined           [facility]



                                                                 [facility]



                                                                 [facility]



                                                                 ent



                                                                 Clinics

 

     Atorvastati Atorvastati           Yes  Seferino                not           

 [facility]



     n Calcium n Calcium                Osorio                defined           

[facility]



                                                                 [facility]



                                                                 [facility]



                                                                 ent



                                                                 Clinics

 

     Metformin Metformin           Yes  [facility]ino                not            [facility]



     HCl  HCl                 Osorio                defined           [facility]



                                                                 [facility]



                                                                 [facility]



                                                                 ent



                                                                 Clinics

 

     Metoprolol Metoprolol           Yes  Seferino                not            [facility]

[facility]



     Tartrate Tartrate                Osorio                defined           [facility]

[facility]



                                                                 [facility]



                                                                 l



                                                                 Outpati



                                                                 ent



                                                                 Clinics







Procedures

This patient has no known procedures.



Encounters







        Start   End     Encounter Admission Attending Care    Care    Encounter 

Source



        Date/Time Date/Time Type    Type    Clinicians Facility Department ID   

   

 

        2022         Outpatient         [facility]  

 [facility]



        13:45:00                         Gideon                 [id number]   [facility]



                                                                        [facility]



                                                                        ent



                                                                        Clinics

 

        2022         Outpatient         Nash,  [facility]LMLC  [facility]  [id number]

 [facility]



        13:44:33                         Gideon                 [id number]   [facility]



                                                                        [facility]



                                                                        ent



                                                                        Clinics

 

        2022         Outpatient         Nash,  STLMLC  [facility]  [id number]

 [facility]



        12:54:40                         Gideon                 [id number]   [facility]



                                                                        [facility]



                                                                        ent



                                                                        Clinics

 

        2022         [facility]         [facility]  

 [facility]



        11:51:18                         Gideon                 [id number]   [facility]



                                                                        [facility]



                                                                        ent



                                                                        Clinics

 

        2022         Outpatient         [facility]  

 [facility]



        11:50:09                         Gideon                 [id number]   [facility]



                                                                        [facility]



                                                                        ent



                                                                        Clinics

 

        2021-09-10 2021-09-10 Outpatient                 [facility]     [facility]     [id number]

[facility]



        08:00:00 08:00:00                                         [id number]    [facility]

l



                                                                        Group

 

        2021 Outpatient                 [facility]C  [facility]  [id number]

 [facility]



        00:00:00 00:00:00                                                 [facility]



                                                                        [facility]



                                                                        [facility]pati



                                                                        ent



                                                                        Clinics

 

        2021 Outpatient                 [facility]  [facility]  [id number]

 [facility]



        00:00:00 00:00:00                                                 [facility]



                                                                        [facility]



                                                                        [facility]pati



                                                                        ent



                                                                        Clinics

 

        2021 Outpatient                 [facility]  [facility]  [id number]

 [facility]



        00:00:00 00:00:00                                                 [facility]



                                                                        [facility]



                                                                        Outpati



                                                                        ent



                                                                        Clinics

 

        2020-10-06 2020-10-06 Outpatient                 [facility]  [facility]  [id number]

 [facility]



        00:00:00 00:00:00                                                 [facility]



                                                                        [facility]



                                                                        [facility]pati



                                                                        ent



                                                                        Clinics

 

        2020 Outpatient         Ige-Odunuga [facility]     [facility]     792

 [facility]



        07:12:00 07:12:00                 _J_AH                   [id number]   Family



                                                                        Practic



                                                                        e

 

        2020 Outpatient         Ige-Odunuga [facility]     [facility]     792

 [facility]



        07:12:00 07:12:00                 _J_AH                   [id number]   Family



                                                                        Practic



                                                                        e

 

        2019 Outpatient                 Elio Stapleton 27

[id number] [facility]



        08:00:00 08:00:00                         t [facility]         [facility]

[facility]



                                                [facility]         [facility]         ent



                                                                        Clinics







Results

This patient has no known results.

## 2022-04-23 NOTE — ER
Nurse's Notes                                                                                     

 [facility]                                                                 

Name: Ina Beck                                                                                

Age: 76 yrs                                                                                       

Sex: Female                                                                                       

: 1945                                                                                   

MRN: [id number]                                                                                   

Arrival Date: 2022                                                                          

Time: 15:03                                                                                       

Account#: [id number]                                                                            

Bed 15                                                                                            

Private MD:                                                                                       

Diagnosis: Unspecified asthma with (acute) exacerbation                                           

                                                                                                  

Presentation:                                                                                     

                                                                                             

15:14 Chief complaint: Patient states: coughing, nasal drainage, difficulty breathing and     vg1 

      sneezing x 1 week; states was prescribed Azelastine and an inhaler but nothing is           

      helping with symptoms. Pt also states is unable to see PCP and is needing a refill on       

      BP medications. Coronavirus screen: Vaccine status: Patient reports being unvaccinated.     

      Client denies travel out of the [place] in the last 14 days. Client presents with at least     

      one sign or symptom that may indicate coronavirus-19. Standard/surgical mask placed on      

      the client. Ebola Screen: Patient denies exposure to infectious person. Patient denies      

      travel to an Ebola-affected area in the 21 days before illness onset. Initial Sepsis        

      Screen: Does the patient meet any 2 criteria? No. Patient's initial sepsis screen is        

      negative. Does the patient have a suspected source of infection? No. Patient's initial      

      sepsis screen is negative. Risk Assessment: Do you want to hurt yourself or someone         

      else? Patient reports no desire to harm self or others. Onset of symptoms was 2022.                                                                                       

15:14 Method Of Arrival: Ambulatory                                                           vg1 

15:14 Acuity: PHIL 3                                                                           vg1 

                                                                                                  

Triage Assessment:                                                                                

15:19 General: Appears comfortable, Behavior is calm, cooperative. Pain: Denies pain.         vg1 

      Respiratory: Airway is patent Respiratory effort is even, unlabored.                        

                                                                                                  

Historical:                                                                                       

- Allergies:                                                                                      

15:19 No Known Allergies;                                                                     vg1 

- Home Meds:                                                                                      

15:19 Glipizide Oral [Active]; Hydralazine Oral [Active]; Metformin Oral [Active]; Symbicort  vg1 

      inhalation [Active];                                                                        

- PMHx:                                                                                           

15:19 Asthma; Diabetes - NIDDM; Hypertension;                                                 vg1 

                                                                                                  

- Immunization history:: Client reports having NOT received the Covid vaccine.                    

- Social history:: Smoking status: Patient denies any tobacco usage or history of.                

                                                                                                  

                                                                                                  

Screening:                                                                                        

15:54 Abuse screen: Denies threats or abuse. Nutritional screening: No deficits noted.        ll1 

      Tuberculosis screening: No symptoms or risk factors identified.                             

16:46 Fall Risk None identified.                                                              ph  

                                                                                                  

Assessment:                                                                                       

15:54 Reassessment: No changes from previously documented assessment. Patient and/or family   ll1 

      updated on plan of care and expected duration. Pain level reassessed. Patient is alert,     

      oriented x 3, equal unlabored respirations, skin warm/dry/pink.                             

16:45 General: Appears in no apparent distress. comfortable, Behavior is calm, cooperative,   ph  

      appropriate for age, Denies fever, feeling ill. Pain: Denies pain. Neuro: Level of          

      Consciousness is awake, alert, obeys commands, Oriented to person, place, time,             

      situation. Respiratory: Reports shortness of breath at rest cough that is                   

      non-productive, Airway is patent Respiratory effort is even, unlabored. GI: No signs        

      and/or symptoms were reported involving the gastrointestinal system. EENT: Reports          

      nasal congestion nasal discharge that is watery. Derm: Skin is intact, Skin is pink,        

      warm \T\ dry. Musculoskeletal: Circulation, motion, and sensation intact. Range of          

      motion: intact in all extremities.                                                          

18:00 Reassessment: Patient appears in no apparent distress at this time. Patient and/or      ph  

      family updated on plan of care and expected duration. Pain level reassessed. Patient is     

      alert, oriented x 3, equal unlabored respirations, skin warm/dry/pink. Patient states       

      symptoms have improved.                                                                     

                                                                                                  

Vital Signs:                                                                                      

15:14  / 73; Pulse 69; Resp 18; Temp 99.0(TE); Pulse Ox 97% on R/A; Weight 88.45 kg;    vg1 

      Height 4 ft. 11 in. (149.86 cm); Pain 0/10;                                                 

16:45  / 78; Pulse 62; Resp 18; Pulse Ox 98% on Nebulizer Mask;                         ph  

18:00  / 72; Pulse 64; Resp 18; Temp 98.2; Pulse Ox 97% on R/A;                         ph  

15:14 Body Mass Index 39.38 (88.45 kg, 149.86 cm)                                             vg1 

                                                                                                  

ED Course:                                                                                        

15:03 Patient arrived in ED.                                                                  rg4 

15:10 Panfilo Hannon PA is PHCP.                                                              jmm 

15:10 Reba Oakley MD is Attending Physician.                                           jm 

15:19 Triage completed.                                                                       vg1 

15:19 Arm band placed on Patient placed in waiting room.                                      vg1 

15:53 Geovanna Guerra, RN is Primary Nurse.                                                     ll1 

15:54 Patient placed in an exam room, on a stretcher.                                         ll1 

15:54 Patient has correct armband on for positive identification. Bed in low position. Call   1 

      light in reach. Side rails up X 1. Pulse ox on. NIBP on.                                    

16:05 Carmen Guadarrama, RN is Primary Nurse.                                                    ph  

19:15 No provider procedures requiring assistance completed. Patient did not have IV access   ph  

      during this emergency room visit.                                                           

                                                                                                  

Administered Medications:                                                                         

16:44 Drug: predniSONE 60 mg Route: PO;                                                       ph  

17:00 Follow up: Response: No adverse reaction                                                ph  

16:44 Drug: Xopenex (levalbuterol) (3) 1.25 mg Route: Inhalation;                             ph  

17:00 Follow up: Response: No adverse reaction                                                ph  

                                                                                                  

                                                                                                  

Outcome:                                                                                          

18:50 Discharge ordered by MD.                                                                [facility] 

19:21 Patient left the ED.                                                                    ph  

19:21 Discharged to home ambulatory, with family.                                             ph  

19:21 Condition: good                                                                             

19:21 Discharge instructions given to patient, Instructed on discharge instructions, follow       

      up and referral plans. medication usage, Demonstrated understanding of instructions,        

      follow-up care, medications, Prescriptions given X 1.                                       

                                                                                                  

Signatures:                                                                                       

Panfilo Hannon PA PA jmm Hall, Patricia, RN                      RN                                                      

Deborah Beck                                 4                                                  

Michell Beck RN                    RN   1                                                  

Geovanna Guerra, RN                       RN   1                                                  

                                                                                                  

**************************************************************************************************